# Patient Record
Sex: FEMALE | Race: WHITE | NOT HISPANIC OR LATINO | Employment: OTHER | URBAN - METROPOLITAN AREA
[De-identification: names, ages, dates, MRNs, and addresses within clinical notes are randomized per-mention and may not be internally consistent; named-entity substitution may affect disease eponyms.]

---

## 2020-08-27 NOTE — PATIENT DISCUSSION
BENIGN PERIPHERAL RETINOSCHISIS OU:  NO RETINAL BREAKS; NO TREATMENT INDICATED AT THIS TIME. RETURN AS SCHEDULED.

## 2022-11-30 ENCOUNTER — NEW PATIENT (OUTPATIENT)
Dept: URBAN - METROPOLITAN AREA CLINIC 39 | Facility: CLINIC | Age: 67
End: 2022-11-30

## 2022-11-30 DIAGNOSIS — H02.832: ICD-10-CM

## 2022-11-30 DIAGNOSIS — H02.835: ICD-10-CM

## 2022-11-30 DIAGNOSIS — L98.8: ICD-10-CM

## 2022-11-30 DIAGNOSIS — H02.831: ICD-10-CM

## 2022-11-30 DIAGNOSIS — H02.834: ICD-10-CM

## 2022-11-30 PROCEDURE — 99499 UNLISTED E&M SERVICE: CPT

## 2022-11-30 PROCEDURE — 96999JUP JUVEDERM ULTRA PLUS 1 CC

## 2022-11-30 ASSESSMENT — VISUAL ACUITY
OS_SC: 20/40
OD_SC: 20/40-2